# Patient Record
Sex: FEMALE | Race: ASIAN | URBAN - METROPOLITAN AREA
[De-identification: names, ages, dates, MRNs, and addresses within clinical notes are randomized per-mention and may not be internally consistent; named-entity substitution may affect disease eponyms.]

---

## 2023-06-06 ENCOUNTER — APPOINTMENT (OUTPATIENT)
Dept: URBAN - METROPOLITAN AREA CLINIC 206 | Age: 28
Setting detail: DERMATOLOGY
End: 2023-06-08

## 2023-06-06 DIAGNOSIS — B36.8 OTHER SPECIFIED SUPERFICIAL MYCOSES: ICD-10-CM

## 2023-06-06 PROCEDURE — OTHER PRESCRIPTION: OTHER

## 2023-06-06 PROCEDURE — 99203 OFFICE O/P NEW LOW 30 MIN: CPT

## 2023-06-06 PROCEDURE — OTHER MEDICATION COUNSELING: OTHER

## 2023-06-06 PROCEDURE — OTHER COUNSELING: OTHER

## 2023-06-06 PROCEDURE — OTHER MIPS QUALITY: OTHER

## 2023-06-06 RX ORDER — KETOCONAZOLE 20 MG/ML
SHAMPOO, SUSPENSION TOPICAL
Qty: 120 | Refills: 3 | COMMUNITY
Start: 2023-06-06

## 2023-06-06 RX ORDER — FLUCONAZOLE 200 MG/1
TABLET ORAL
Qty: 8 | Refills: 0 | COMMUNITY
Start: 2023-06-06

## 2023-06-06 RX ORDER — KETOCONAZOLE 20 MG/G
CREAM TOPICAL
Qty: 60 | Refills: 3 | COMMUNITY
Start: 2023-06-06

## 2023-06-06 ASSESSMENT — LOCATION ZONE DERM
LOCATION ZONE: ARM
LOCATION ZONE: FACE

## 2023-06-06 ASSESSMENT — LOCATION SIMPLE DESCRIPTION DERM
LOCATION SIMPLE: RIGHT FOREARM
LOCATION SIMPLE: LEFT CHEEK
LOCATION SIMPLE: RIGHT CHEEK
LOCATION SIMPLE: SUPERIOR FOREHEAD
LOCATION SIMPLE: LEFT FOREARM

## 2023-06-06 ASSESSMENT — LOCATION DETAILED DESCRIPTION DERM
LOCATION DETAILED: RIGHT DISTAL DORSAL FOREARM
LOCATION DETAILED: RIGHT CENTRAL MALAR CHEEK
LOCATION DETAILED: LEFT DISTAL DORSAL FOREARM
LOCATION DETAILED: LEFT CENTRAL MALAR CHEEK
LOCATION DETAILED: SUPERIOR MID FOREHEAD

## 2023-06-06 NOTE — PROCEDURE: MEDICATION COUNSELING
Xelromyz Pregnancy And Lactation Text: This medication is Pregnancy Category D and is not considered safe during pregnancy.  The risk during breast feeding is also uncertain.

## 2023-06-06 NOTE — PROCEDURE: MEDICATION COUNSELING
Chief complaint:   No chief complaint on file.      Vitals:  Visit Vitals  LMP 01/01/1998 (Approximate)       HISTORY OF PRESENT ILLNESS     HPI    Other significant problems:  Patient Active Problem List    Diagnosis Date Noted   • Pain--Interventional Pain Management 03/07/2018     Priority: Medium     Procedure Relief Sheet:  Gisell Booth   Ref: Dr. Levine (coccyx injection)     Date  Procedure/Office visit % Relief Amt  Steroid NOTES Provider/  Initial   02/07/18 Bilateral L5S1 TFESI-Dr. Levine  C8     03/07/18 Coccygeal nerve block   K40  sa/dg   04/04/18 FJNB (B) L3-4 L4-5 100%  Therapeutic response sa/dg   06/20/18 FJNB (B) L3-4 L4-5 100% LEFT  0% RIGHT   sa/dg   07/17/19 TF-ANA (R)  L3-4, L4-5  60% back pain K80  sa/dg   7/17/19 (B) TF ANA, (B), L4-5 100% leg pain, 50% back pain K80             08/18/20 TF-ANA (B) L4-5 50% K80  sa/dg   10/06/20 FJNB (B) L3-4 L4-5 L5S1 0%   sa/dg                          • Pneumonia due to infectious organism 04/08/2020     Priority: Low   • Lung mass 03/17/2020     Priority: Low   • Peripheral polyneuropathy 01/12/2018     Priority: Low   • After cataract of right eye not obscuring vision 10/23/2017     Priority: Low   • Pseudophakia 10/23/2017     Priority: Low   • Hallux rigidus, right foot 05/04/2017     Priority: Low   • Hammertoes of both feet 05/04/2017     Priority: Low   • Spondylosis of lumbar region without myelopathy or radiculopathy 04/19/2017     Priority: Low   • Arthritis of midfoot 10/15/2014     Priority: Low   • Special screening for malignant neoplasms, colon 02/21/2014     Priority: Low   • Left Midfoot Arthritis (1-2-3 TMT Joints) 03/14/2013     Priority: Low   • Left Deep Peroneal Mononeuritis 03/14/2013     Priority: Low   • Degeneration of lumbar or lumbosacral intervertebral disc 12/05/2012     Priority: Low   • Vitreous degeneration: floater OD 05/07/2012     Priority: Low   • Enthesopathy of hip region-L 01/30/2012     Priority: Low   •  Trapeziometacarpal OA, Bilateral 06/25/2009     Priority: Low   • Benign neoplasm of other specified sites      Priority: Low   • Variants of migraine, not elsewhere classified, without mention of intractable migraine without mention of status migrainosus 07/22/2003     Priority: Low   • Duodenal ulcer, unspecified as acute or chronic, without hemorrhage, perforation, or obstruction 07/22/2003     Priority: Low   • Other and unspecified disc disorder of cervical region 07/22/2003     Priority: Low       PAST MEDICAL, FAMILY AND SOCIAL HISTORY     Medications:  Current Outpatient Medications   Medication   • HYDROcodone-acetaminophen (NORCO) 5-325 MG per tablet   • pantoprazole (PROTONIX) 40 MG tablet   • tobramycin (TOBREX) 0.3 % ophthalmic solution   • gabapentin (NEURONTIN) 600 MG tablet   • DULoxetine (CYMBALTA) 60 MG capsule   • clobetasol (TEMOVATE) 0.05 % ointment   • nortriptyline (PAMELOR) 75 MG capsule   • Acetaminophen (APAP PO)   • polyethylene glycol (MIRALAX) powder   • Multiple Vitamins-Minerals (MULTIPLE VITAMINS/WOMENS) Tab   • Omega-3 Fatty Acids (FISH OIL) 1000 MG capsule   • Glucosamine-Chondroit-Vit C-Mn (GLUCOSAMINE CHONDR 1500 COMPLX) tablet   • Bioflavonoid Products (LEONA-C) TABS   • CALCIUM CARBONATE-VITAMIN D 250-125 MG-UNIT PO TABS     No current facility-administered medications for this visit.        Allergies:  ALLERGIES:  No Known Allergies    Past Medical  History/Surgeries:  Past Medical History:   Diagnosis Date   • Benign neoplasm of other specified sites    • Chronic pain 2018, 2019    Lower back   • Duodenal ulcer, unspecified as acute or chronic, without hemorrhage, perforation, or obstruction    • Gastroesophageal reflux disease    • Lichen sclerosus    • Neoplasm of uncertain behavior of other specified sites    • Other and unspecified disc disorder of cervical region     disk in neck problems-seeing neurology   • Shoulder pain     rotator cuff tendinitis   • Variants of  migraine, not elsewhere classified, without mention of intractable migraine without mention of status migrainosus        Past Surgical History:   Procedure Laterality Date   • Colorec canc scrn,colonoscopy hi risk  5/12/04    Dr. Garcia/Colorectal Screening/recall colon 5/2014   • Colorec canc scrn,colonoscopy not hi risk  3/27/14    Follow up 10 years   • Correct bunion,metatarsal osteotomy Right 06/05/2017    Dr. King   • Cyst removal  9/05    Excision of back cyst, Dr Dubon   • D and c  1996    D&C   • Epidural steroid injection  Bilateral 08/18/2020    L4-5   • Full rout obste care,vaginal deliv      x2   • Ir epidural injection  2/6/14    L5-S1   • Laparoscopy,vag hysterectomy  1998   • Lumbar epidural injection  02/07/2018   • Lumbar epidural injection  07/18/2018    Lumbar ANA   • Neck/chest procedure unlisted  1999    upper back surgery for compressed vertebra C4 C5 C6   • Nerve block  03/07/2018    Coccyx   • Nerve repair w conduit allograft each nerve Right 2/1/2016    Dr. King   • Paravertebral facet inj jnt lumbar sacral 1  06/20/2018    Lumbar Facet / MBB   • Part remv othr tarsal/metatarsal Right 2/1/2016    Dr. King   • Removal of ovary(s)  1998    Oophorectomy-bilateral   • Remv cataract extracap insert lens  09/23/2011    IOL right eye SN60WF 19.5 Dr. ALISHA Dillon   • Remv cataract extracap insert lens  10/11/11    phaco with iol left SN60WF 20.5 Dr.T. Dillon   • Repair of hammertoe,one Right 06/05/2017    Dr. King   • Shav skin les 1.1-2.0cm trunk,arm,leg  7/07    Dr Dubon   • Tarsal tunnel release Right 2/1/2016    Dr. King   • [place] tooth extraction  20's    wisdom teeth pulled       Family History:  Family History   Problem Relation Age of Onset   • Diabetes Mother         Death age 81   • Congestive Heart Failure Mother         Open heart surgery   • Other Mother         Kidney removed   • Cataracts Father    • Cancer Father         Stomach CA- Death age 86   • Stroke Maternal  Grandmother         CVA- Death age 78   • Cancer, Lung Maternal Grandfather         Lung CA- Death age 68   • * Paternal Grandmother         Unknown- Death age 87   • * Paternal Grandfather         Unknown- Death age 80's   • Hemochromatosis Sister    • Hemochromatosis Brother    • Hemochromatosis Brother    • * Daughter         Healthy   • Heart Daughter         Giant Cell Myocarditis- Death age 38   • Heart Paternal Aunt         Death age 87   • Cancer, Breast Paternal Aunt 90        Breast CA- Dx in 90's, Death age 90's   • Hemochromatosis Sister    • Cancer, Breast Sister 68        Breast CA at age 68??   • Gastrointestinal Sister    • Other Paternal Aunt         Brain tumor- Death age 86   • NEGATIVE FAMILY HX OF Maternal Aunt         No breast or ovarian CA- Death age 84   • NEGATIVE FAMILY HX OF Maternal Aunt         No breast or ovarian CA- Death age 89   • NEGATIVE FAMILY HX OF Other         No family Hx of colon or ovarian CA   • Hemochromatosis Brother    • Asthma Grandson        Social History:  Social History     Tobacco Use   • Smoking status: Former Smoker     Packs/day: 0.50     Years: 3.00     Pack years: 1.50     Types: Cigarettes     Quit date: 1963     Years since quittin.8   • Smokeless tobacco: Never Used   • Tobacco comment:  quit age 21   Substance Use Topics   • Alcohol use: Yes     Alcohol/week: 2.0 standard drinks     Types: 2 Cans of beer per week     Comment: socially       REVIEW OF SYSTEMS     Review of Systems   Constitutional: Positive for activity change. Negative for appetite change, chills, diaphoresis, fatigue, fever and unexpected weight change.   HENT: Negative for ear discharge, ear pain and hearing loss.    Eyes: Negative for photophobia and visual disturbance.   Cardiovascular: Negative for chest pain.   Gastrointestinal: Negative for constipation, nausea and vomiting.   Musculoskeletal: Positive for back pain. Negative for gait problem, joint swelling, myalgias and  neck stiffness.   Skin: Negative for color change and rash.   Neurological: Negative for weakness, light-headedness, numbness and headaches.   Hematological: Does not bruise/bleed easily.   Psychiatric/Behavioral: Negative for suicidal ideas.       PHYSICAL EXAM     Physical Exam  Constitutional:       General: She is not in acute distress.     Appearance: She is well-developed. She is not diaphoretic.   HENT:      Head: Normocephalic and atraumatic.   Eyes:      General: No scleral icterus.     Pupils: Pupils are equal, round, and reactive to light.   Neck:      Musculoskeletal: Neck supple.      Thyroid: No thyromegaly.   Abdominal:      Palpations: Abdomen is soft. There is no mass.      Tenderness: There is no abdominal tenderness. There is no guarding.   Skin:     Findings: No rash.   Neurological:      Mental Status: She is alert and oriented to person, place, and time.      Cranial Nerves: No cranial nerve deficit.      Motor: No abnormal muscle tone.   Psychiatric:         Behavior: Behavior normal.         ASSESSMENT/PLAN     Lumbar canal stenosis with neurogenic claudication    Lumbar decompression; interspinous spacer placement; Vertiflex  procedure L3-4 L4-5   Tetracycline Pregnancy And Lactation Text: This medication is Pregnancy Category D and not consider safe during pregnancy. It is also excreted in breast milk.